# Patient Record
Sex: FEMALE | Race: BLACK OR AFRICAN AMERICAN | Employment: UNEMPLOYED | URBAN - METROPOLITAN AREA
[De-identification: names, ages, dates, MRNs, and addresses within clinical notes are randomized per-mention and may not be internally consistent; named-entity substitution may affect disease eponyms.]

---

## 2017-09-22 ENCOUNTER — HOSPITAL ENCOUNTER (OUTPATIENT)
Dept: PHYSICAL THERAPY | Age: 67
Discharge: HOME | End: 2017-09-22
Payer: MEDICAID

## 2017-09-22 PROCEDURE — 97110 THERAPEUTIC EXERCISES: CPT | Performed by: PHYSICAL THERAPIST

## 2017-09-22 PROCEDURE — 97162 PT EVAL MOD COMPLEX 30 MIN: CPT | Performed by: PHYSICAL THERAPIST
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
## 2017-09-29 ENCOUNTER — HOSPITAL ENCOUNTER (OUTPATIENT)
Dept: PHYSICAL THERAPY | Age: 67
Discharge: HOME | End: 2017-09-29
Payer: MEDICAID

## 2017-09-29 PROCEDURE — 97112 NEUROMUSCULAR REEDUCATION: CPT

## 2017-09-29 PROCEDURE — 97110 THERAPEUTIC EXERCISES: CPT
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
## 2017-10-02 ENCOUNTER — HOSPITAL ENCOUNTER (OUTPATIENT)
Dept: PHYSICAL THERAPY | Age: 67
Discharge: HOME | End: 2017-10-02
Payer: MEDICAID

## 2017-10-02 PROCEDURE — 97110 THERAPEUTIC EXERCISES: CPT

## 2017-10-02 PROCEDURE — 97112 NEUROMUSCULAR REEDUCATION: CPT
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
## 2017-10-06 ENCOUNTER — HOSPITAL ENCOUNTER (OUTPATIENT)
Dept: PHYSICAL THERAPY | Age: 67
Discharge: HOME | End: 2017-10-06
Payer: MEDICAID

## 2017-10-06 PROCEDURE — 97112 NEUROMUSCULAR REEDUCATION: CPT

## 2017-10-06 PROCEDURE — 97110 THERAPEUTIC EXERCISES: CPT
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
## 2017-10-09 ENCOUNTER — HOSPITAL ENCOUNTER (OUTPATIENT)
Dept: PHYSICAL THERAPY | Age: 67
Discharge: HOME | End: 2017-10-09
Payer: MEDICAID

## 2017-10-09 PROCEDURE — 97112 NEUROMUSCULAR REEDUCATION: CPT

## 2017-10-09 PROCEDURE — 97110 THERAPEUTIC EXERCISES: CPT
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
## 2017-10-12 ENCOUNTER — APPOINTMENT (OUTPATIENT)
Dept: PHYSICAL THERAPY | Age: 67
End: 2017-10-12
Payer: MEDICAID

## 2017-10-12 NOTE — PROGRESS NOTES
[facility]              [address]        [place], [address] [place]   [phone] fax    Discharge Summary  Patient name: Shahana Saenz Start of Care: 17   Referral source: Brock Salmon MD : 1950   Medical/Treatment Diagnosis: Dorsalgia, unspecified [M54.9]  Cervicalgia [M54.2]  Pain in unspecified shoulder [M25.519]  Pain in unspecified hip [M25.559] Onset Date:17     Prior Hospitalization: see medical history Provider#: [id number]   Medications: Verified on Patient Summary List    Comorbidities: allergies, arthritis, back pain, BMI > 30, HA, HBP   Prior Level of Function: Hx mild B knee pain, not working, lives w/ daughter, not ambulating w/ AD  Visits from Start of Care: 5    Missed Visits: 0  Reporting Period : 17 to 10/09/17    Summary of Care:  Pt is to go see the orthopedic specialist prior to continuing PT as instructed by her PCP. DC at this time. ASSESSMENT/RECOMMENDATIONS:  [x]Discontinue therapy: []Patient has reached or is progressing toward set goals      []Patient is non-compliant or has abdicated      []Due to lack of appreciable progress towards set goals  OTHER: Pt to go to orthopedic specialist prior to continuing w/ PT    Tima Alonso PT 10/12/2017 10:03 AM    NOTE TO PHYSICIAN:  Please complete the following and fax to: [facility] at [phone]  . Retain this original for your records. If you are unable to process this request in   24 hours, please contact our office.      [] I have read the above report and request that my patient continue therapy with the following changes/special instructions:  [] I have read the above report and request that my patient be discharged from therapy    Physician's Signature:_____________________ Date:___________Time:__________

## 2017-10-18 ENCOUNTER — APPOINTMENT (OUTPATIENT)
Dept: PHYSICAL THERAPY | Age: 67
End: 2017-10-18
Payer: MEDICAID

## 2017-10-20 ENCOUNTER — APPOINTMENT (OUTPATIENT)
Dept: PHYSICAL THERAPY | Age: 67
End: 2017-10-20
Payer: MEDICAID

## 2017-10-23 ENCOUNTER — APPOINTMENT (OUTPATIENT)
Dept: PHYSICAL THERAPY | Age: 67
End: 2017-10-23
Payer: MEDICAID

## 2017-10-26 ENCOUNTER — APPOINTMENT (OUTPATIENT)
Dept: PHYSICAL THERAPY | Age: 67
End: 2017-10-26
Payer: MEDICAID

## 2017-10-30 ENCOUNTER — APPOINTMENT (OUTPATIENT)
Dept: PHYSICAL THERAPY | Age: 67
End: 2017-10-30
Payer: MEDICAID

## 2017-11-01 ENCOUNTER — OFFICE VISIT (OUTPATIENT)
Dept: ORTHOPEDIC SURGERY | Age: 67
End: 2017-11-01

## 2017-11-01 VITALS
HEART RATE: 71 BPM | SYSTOLIC BLOOD PRESSURE: 138 MMHG | BODY MASS INDEX: 40.34 KG/M2 | WEIGHT: 251 LBS | HEIGHT: 66 IN | DIASTOLIC BLOOD PRESSURE: 86 MMHG

## 2017-11-01 DIAGNOSIS — M54.50 LOW BACK PAIN WITHOUT SCIATICA, UNSPECIFIED BACK PAIN LATERALITY, UNSPECIFIED CHRONICITY: Primary | ICD-10-CM

## 2017-11-01 DIAGNOSIS — M54.2 CERVICAL PAIN (NECK): ICD-10-CM

## 2017-11-01 DIAGNOSIS — M54.6 THORACIC BACK PAIN, UNSPECIFIED BACK PAIN LATERALITY, UNSPECIFIED CHRONICITY: ICD-10-CM

## 2017-11-01 DIAGNOSIS — M47.816 SPONDYLOSIS OF LUMBAR REGION WITHOUT MYELOPATHY OR RADICULOPATHY: ICD-10-CM

## 2017-11-01 DIAGNOSIS — M51.36 DDD (DEGENERATIVE DISC DISEASE), LUMBAR: ICD-10-CM

## 2017-11-01 RX ORDER — CLONIDINE HYDROCHLORIDE 0.3 MG/1
TABLET ORAL
Refills: 1 | COMMUNITY
Start: 2017-10-19
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
## 2017-11-01 NOTE — MR AVS SNAPSHOT
Visit Information Date & Time Provider Department Dept. Phone Encounter #  
 11/1/2017  3:05 PM Timo Gold  [facility] [address] [facility] - [place] [phone] Follow-up Instructions Return in about 4 weeks (around 11/29/2017). Upcoming Health Maintenance Date Due Hepatitis C Screening 1950 DTaP/Tdap/Td series (1 - Tdap) 6/11/1973 BREAST CANCER SCRN MAMMOGRAM 6/11/2002 FOBT Q 1 YEAR AGE 50-75 6/11/2002 ZOSTER VACCINE AGE 60> 4/11/2012 GLAUCOMA SCREENING Q2Y 6/11/2017 OSTEOPOROSIS SCREENING (DEXA) 6/11/2017 Pneumococcal 65+ Low/Medium Risk (1 of 2 - PCV13) 6/11/2017 MEDICARE YEARLY EXAM 6/11/2017 INFLUENZA AGE 9 TO ADULT 8/1/2017 Allergies as of 11/1/2017  Review Complete On: 11/1/2017 By: Timo Gold MD  
 No Known Allergies Current Immunizations  Never Reviewed No immunizations on file. Not reviewed this visit You Were Diagnosed With   
  
 Codes Comments Low back pain without sciatica, unspecified back pain laterality, unspecified chronicity    -  Primary ICD-10-CM: M54.5 ICD-9-CM: 724.2 Spondylosis of lumbar region without myelopathy or radiculopathy     ICD-10-CM: M47.816 ICD-9-CM: 721.3 DDD (degenerative disc disease), lumbar     ICD-10-CM: M51.36 
ICD-9-CM: 722.52 Thoracic back pain, unspecified back pain laterality, unspecified chronicity     ICD-10-CM: M54.6 ICD-9-CM: 724.1 Cervical pain (neck)     ICD-10-CM: M54.2 ICD-9-CM: 723.1 Vitals BP Pulse Height(growth percentile) Weight(growth percentile) BMI Smoking Status 138/86 71 5' 6\" (1.676 m) 251 lb (113.9 kg) 40.51 kg/m2 Never Smoker Vitals History BMI and BSA Data Body Mass Index Body Surface Area 40.51 kg/m 2 2.3 m 2 Your Updated Medication List  
  
   
This list is accurate as of: 11/1/17  3:52 PM.  Always use your most recent med list.  
  
  
  
  
 cloNIDine HCl 0.3 mg tablet Commonly known as:  CATAPRES TK 1 T PO QHS We Performed the Following AMB POC XRAY, SPINE, LUMBOSACRAL; 2 O [37980 CPT(R)] REFERRAL TO PHYSICAL THERAPY [RRK24 Custom] Comments:  
 DX:Cervical/thoracic/lumbar HEP 
[facility] 
2-3 visits/ 2-3 weeks Follow-up Instructions Return in about 4 weeks (around 11/29/2017). Referral Information Referral ID Referred By Referred To  
  
 [id number] RAHEEL LUGO III Not Available Visits Status Start Date End Date 1 New Request 11/1/17 11/1/18 If your referral has a status of pending review or denied, additional information will be sent to support the outcome of this decision. Introducing [facility]! [facility] introduces [employer] patient portal. Now you can access parts of your medical record, email your doctor's office, and request medication refills online. 1. In your internet browser, go to [email or web address] 2. Click on the First Time User? Click Here link in the Sign In box. You will see the New Member Sign Up page. 3. Enter your [employer] Access Code exactly as it appears below. You will not need to use this code after youve completed the sign-up process. If you do not sign up before the expiration date, you must request a new code. · [employer] Access Code: [email or web address] Expires: 12/20/2017 11:26 AM 
 
4. Enter the last four digits of your Social Security Number (xxxx) and Date of Birth (mm/dd/yyyy) as indicated and click Submit. You will be taken to the next sign-up page. 5. Create a [employer] ID. This will be your [employer] login ID and cannot be changed, so think of one that is secure and easy to remember. 6. Create a [employer] password. You can change your password at any time. 7. Enter your Password Reset Question and Answer. This can be used at a later time if you forget your password. 8. Enter your e-mail address. You will receive e-mail notification when new information is available in [address] 9. Click Sign Up. You can now view and download portions of your medical record. 10. Click the Download Summary menu link to download a portable copy of your medical information. If you have questions, please visit the Frequently Asked Questions section of the "[employer] [facility] [employer] website. Remember, "[employer] [facility] is NOT to be used for urgent needs. For medical emergencies, dial 911. Now available from your iPhone and Android! Please provide this summary of care documentation to your next provider. Your primary care clinician is listed as Blas Ly. If you have any questions after today's visit, please call [phone].

## 2017-11-02 ENCOUNTER — APPOINTMENT (OUTPATIENT)
Dept: PHYSICAL THERAPY | Age: 67
End: 2017-11-02

## 2017-11-08 ENCOUNTER — HOSPITAL ENCOUNTER (OUTPATIENT)
Dept: PHYSICAL THERAPY | Age: 67
Discharge: HOME | End: 2017-11-08
Payer: MEDICARE

## 2017-11-08 PROCEDURE — 97110 THERAPEUTIC EXERCISES: CPT | Performed by: PHYSICAL THERAPIST

## 2017-11-08 PROCEDURE — G8979 MOBILITY GOAL STATUS: HCPCS | Performed by: PHYSICAL THERAPIST

## 2017-11-08 PROCEDURE — 97162 PT EVAL MOD COMPLEX 30 MIN: CPT | Performed by: PHYSICAL THERAPIST

## 2017-11-08 PROCEDURE — 97140 MANUAL THERAPY 1/> REGIONS: CPT | Performed by: PHYSICAL THERAPIST

## 2017-11-08 PROCEDURE — G8978 MOBILITY CURRENT STATUS: HCPCS | Performed by: PHYSICAL THERAPIST
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
## 2017-11-09 ENCOUNTER — HOSPITAL ENCOUNTER (OUTPATIENT)
Dept: PHYSICAL THERAPY | Age: 67
Discharge: HOME | End: 2017-11-09
Payer: MEDICARE

## 2017-11-09 PROCEDURE — 97140 MANUAL THERAPY 1/> REGIONS: CPT

## 2017-11-09 PROCEDURE — 97110 THERAPEUTIC EXERCISES: CPT

## 2017-11-09 PROCEDURE — 97112 NEUROMUSCULAR REEDUCATION: CPT

## 2017-11-09 NOTE — PROGRESS NOTES
PT DAILY TREATMENT NOTE - [facility]     Patient Name: Rosa Elena Klein  Date:2017  : 1950  [x]  Patient  Verified  Payor: [place] MEDICARE / Plan: [place] MEDICARE PART A & B / Product Type: Medicare /    In time:4:52  Out time:5:49  Total Treatment Time (min): 57  Total Timed Codes (min): 47  1:1 Treatment Time ( [address] only): 52   Visit #: 2 of 12    Treatment Area: Low back pain [M54.5]  Cervicalgia [M54.2]  Pain in thoracic spine [M54.6]    SUBJECTIVE  Pain Level (0-10 scale): 4  Any medication changes, allergies to medications, adverse drug reactions, diagnosis change, or new procedure performed?: [x] No    [] Yes (see summary sheet for update)  Subjective functional status/changes:   [] No changes reported  Pt reports that the left side of her back is bothering her more today. OBJECTIVE    Modality rationale: decrease pain to improve the patients ability to decrease difficulty while performing tasks.     Min Type Additional Details    [] Estim:  []Unatt       []IFC  []Premod                        []Other:  []w/ice   []w/heat  Position:  Location:    [] Estim: []Att    []TENS instruct  []NMES                    []Other:  []w/US   []w/ice   []w/heat  Position:  Location:    []  Traction: [] Cervical       []Lumbar                       [] Prone          []Supine                       []Intermittent   []Continuous Lbs:  [] before manual  [] after manual    []  Ultrasound: []Continuous   [] Pulsed                           []1MHz   []3MHz W/cm2:  Location:    []  Iontophoresis with dexamethasone         Location: [] Take home patch   [] In clinic   10 []  Ice     [x]  heat  []  Ice massage  []  Laser   []  Anodyne Position:sitting  Location:neck and back     []  Laser with stim  []  Other:  Position:  Location:    []  Vasopneumatic Device Pressure:       [] lo [] med [] hi   Temperature: [] lo [] med [] hi   [] Skin assessment post-treatment:  []intact []redness- no adverse reaction    []redness  adverse reaction:       27 min Therapeutic Exercise:  [x] See flow sheet :   Rationale: increase ROM and increase strength to improve the patients ability to increase tolerance to activities. 10 min Neuromuscular Re-education:  [x]  See flow sheet :core activation    Rationale: increase ROM and increase strength  to improve the patients ability to increase standing tolerance. 10 min Manual Therapy:  SOR, TPR/DTM to B UT, LS, cervical paraspinals, lumbar  paraspinals, B piriformis mm and gluts, l/s, nd c/s PA's   Rationale: decrease pain, increase ROM, increase tissue extensibility and decrease trigger points to increase ease with ADLs. With   [] TE   [] TA   [] neuro   [] other: Patient Education: [x] Review HEP    [] Progressed/Changed HEP based on:   [] positioning   [] body mechanics   [] transfers   [] heat/ice application    [] other:      Other Objective/Functional Measures:Pt reports performing HEP. Pain Level (0-10 scale) post treatment: 0    ASSESSMENT/Changes in Function: Initiated exercises per POC. Patient will continue to benefit from skilled PT services to modify and progress therapeutic interventions, address functional mobility deficits, address ROM deficits, address strength deficits and analyze and address soft tissue restrictions to attain remaining goals. []  See Plan of Care  []  See progress note/recertification  []  See Discharge Summary         Progress towards goals / Updated goals:  Short Term Goals: To be accomplished in 1 weeks:  1. Pt will be independent and compliant w/ HEP to progress gains in PT. At eval: initiated HEP  Current: Goal met: Pt reports performing HEP. 11/9/17   Long Term Goals: To be accomplished in 6 weeks:  1. Pt will improve neck FOTO to > or = to 55 to demo improved function. At eval: neck FOTO = 42    2. Pt will improve back FOTO to > or = to 45 to demo improved function. At eval: back FOTO = 28  3.  Pt will increase Lumbar AROM to pain free grossly for improved ability to shower. At eval: Lumbar AROM limited 25% into flexion w/ pain, ext to neutral w/ pain, B SB WNL w/ pain into R SB  4. Pt will increase MMT B hips to > or = to 4-4+/5 for improved stair navigation. At eval: MMT B LE grossly 3+-4-/5  5. Pt will increase SLS B to > or = to 10 seconds w/o HHA for decreased fall risk.    At eval: Pt unable to stand SLS B w/o HHA x1    PLAN  []  Upgrade activities as tolerated     [x]  Continue plan of care  []  Update interventions per flow sheet       []  Discharge due to:_  []  Other:_      Tita Angela PTA 11/9/2017  5:16 PM    Future Appointments  Date Time Provider Cristian Toth   11/14/2017 2:30 PM Yaneth Samuel, PT [facility]   11/17/2017 9:30 AM Tita Angela PTA [facility]   11/21/2017 4:30 PM Yaneth Samuel PT [facility]   11/29/2017 9:30 AM Tita Angela PTA [facility]   11/30/2017 2:00 PM Tita Angela PTA [facility]   12/4/2017 3:20 PM Heather Jones MD [facility]

## 2017-11-14 ENCOUNTER — HOSPITAL ENCOUNTER (OUTPATIENT)
Dept: PHYSICAL THERAPY | Age: 67
Discharge: HOME | End: 2017-11-14
Payer: MEDICARE

## 2017-11-14 PROCEDURE — 97140 MANUAL THERAPY 1/> REGIONS: CPT | Performed by: PHYSICAL THERAPIST

## 2017-11-14 PROCEDURE — 97112 NEUROMUSCULAR REEDUCATION: CPT | Performed by: PHYSICAL THERAPIST

## 2017-11-14 PROCEDURE — 97110 THERAPEUTIC EXERCISES: CPT | Performed by: PHYSICAL THERAPIST

## 2017-11-14 NOTE — PROGRESS NOTES
PT DAILY TREATMENT NOTE - [facility]     Patient Name: Candy Ortiz  Date:2017  : 1950  [x]  Patient  Verified  Payor: [place] MEDICARE / Plan: [place] MEDICARE PART A & B / Product Type: Medicare /    In time:2:27  Out time:3:26  Total Treatment Time (min): 59  Total Timed Codes (min): 49  1:1 Treatment Time ( [address] only): 52   Visit #: 3 of 12    Treatment Area: Low back pain [M54.5]  Cervicalgia [M54.2]  Pain in thoracic spine [M54.6]    SUBJECTIVE  Pain Level (0-10 scale): 5  Any medication changes, allergies to medications, adverse drug reactions, diagnosis change, or new procedure performed?: [x] No    [] Yes (see summary sheet for update)  Subjective functional status/changes:   [] No changes reported  The back and the leg hurt today. Does not want to use the bike.      OBJECTIVE    Modality rationale: decrease pain to improve the patients ability to complete ADLs   Min Type Additional Details   10 []  Ice     [x]  heat  []  Ice massage  []  Laser   []  Anodyne Position: long sitting  Location: prone   [] Skin assessment post-treatment:  []intact []redness- no adverse reaction    []redness  adverse reaction:     31 min Therapeutic Exercise:  [x] See flow sheet :   Rationale: increase ROM, increase strength and decrease pain to improve the patients ability to complete ADLs    10 min Neuromuscular Re-education:  []  See flow sheet :   Rationale: improve coordination and increase proprioception  to improve the patients ability to complete ADLs    8 min Manual Therapy:  TPR/DTM to piriformis, lumbar paraspinals, PA to lumbar region, SOR, TPR to UT/LS   Rationale: decrease pain to complete ADLs          With   [] TE   [] TA   [] neuro   [] other: Patient Education: [x] Review HEP    [] Progressed/Changed HEP based on:   [] positioning   [] body mechanics   [] transfers   [] heat/ice application    [] other:         Pain Level (0-10 scale) post treatment: 5    ASSESSMENT/Changes in Function: Patient responds well to treatment session. Patient does not tolerate manual therapy forces to an extent able to mobilize vertebral joints. She does report benefit from the intervention nonetheless. No adverse effects were noted from today's treatment session      Patient will continue to benefit from skilled PT services to modify and progress therapeutic interventions, address functional mobility deficits, address ROM deficits, address strength deficits, analyze and address soft tissue restrictions, analyze and cue movement patterns, analyze and modify body mechanics/ergonomics and assess and modify postural abnormalities to attain remaining goals. []  See Plan of Care  []  See progress note/recertification  []  See Discharge Summary         Progress towards goals / Updated goals:  Short Term Goals: To be accomplished in 1 weeks:  1. Pt will be independent and compliant w/ HEP to progress gains in PT. At eval: initiated HEP  Current: Goal met: Pt reports performing HEP. 11/9/17   Long Term Goals: To be accomplished in 6 weeks:  1. Pt will improve neck FOTO to > or = to 55 to demo improved function. At eval: neck FOTO = 42    2. Pt will improve back FOTO to > or = to 45 to demo improved function. At eval: back FOTO = 28  3. Pt will increase Lumbar AROM to pain free grossly for improved ability to shower. At eval: Lumbar AROM limited 25% into flexion w/ pain, ext to neutral w/ pain, B SB WNL w/ pain into R SB  4. Pt will increase MMT B hips to > or = to 4-4+/5 for improved stair navigation. At eval: MMT B LE grossly 3+-4-/5  5. Pt will increase SLS B to > or = to 10 seconds w/o HHA for decreased fall risk.    At eval: Pt unable to stand SLS B w/o HHA x1    PLAN  []  Upgrade activities as tolerated     [x]  Continue plan of care  []  Update interventions per flow sheet       []  Discharge due to:_  []  Other:_      King Sanna, PT, DPT 11/14/2017  2:30 PM    Future Appointments  Date Time Provider Cristian Toth 11/17/2017 9:30 AM Louie Mendez, PTA [facility]   11/21/2017 4:30 PM Esmer Merino, PT [facility]   11/28/2017 1:00 PM Dimple Fester [facility]   12/1/2017 7:30 AM Dimple Fester [facility]   12/4/2017 3:20 PM Bernadette Crowder MD [facility]

## 2017-11-17 ENCOUNTER — HOSPITAL ENCOUNTER (OUTPATIENT)
Dept: PHYSICAL THERAPY | Age: 67
Discharge: HOME | End: 2017-11-17
Payer: MEDICARE

## 2017-11-17 PROCEDURE — 97140 MANUAL THERAPY 1/> REGIONS: CPT

## 2017-11-17 PROCEDURE — 97112 NEUROMUSCULAR REEDUCATION: CPT

## 2017-11-17 NOTE — PROGRESS NOTES
PT DAILY TREATMENT NOTE - [facility]     Patient Name: Rey Woody  Date:2017  : 1950  [x]  Patient  Verified  Payor: [place] MEDICARE / Plan: [place] MEDICARE PART A & B / Product Type: Medicare /    In time:9:25 Out time:10:22  Total Treatment Time (min): 57  Total Timed Codes (min): 47  1:1 Treatment Time ( only): 30   Visit #: 4 of 12    Treatment Area: Low back pain [M54.5]  Cervicalgia [M54.2]  Pain in thoracic spine [M54.6]    SUBJECTIVE  Pain Level (0-10 scale): 4  Any medication changes, allergies to medications, adverse drug reactions, diagnosis change, or new procedure performed?: [x] No    [] Yes (see summary sheet for update)  Subjective functional status/changes:   [] No changes reported  Pt reports not much change since last visit. OBJECTIVE    Modality rationale: decrease pain to improve the patients ability to decrease difficulty while performing tasks.     Min Type Additional Details    [] Estim:  []Unatt       []IFC  []Premod                        []Other:  []w/ice   []w/heat  Position:  Location:    [] Estim: []Att    []TENS instruct  []NMES                    []Other:  []w/US   []w/ice   []w/heat  Position:  Location:    []  Traction: [] Cervical       []Lumbar                       [] Prone          []Supine                       []Intermittent   []Continuous Lbs:  [] before manual  [] after manual    []  Ultrasound: []Continuous   [] Pulsed                           []1MHz   []3MHz W/cm2:  Location:    []  Iontophoresis with dexamethasone         Location: [] Take home patch   [] In clinic   10 []  Ice     [x]  heat  []  Ice massage  []  Laser   []  Anodyne Position:sitting  Location:neck and back     []  Laser with stim  []  Other:  Position:  Location:    []  Vasopneumatic Device Pressure:       [] lo [] med [] hi   Temperature: [] lo [] med [] hi   [] Skin assessment post-treatment:  []intact []redness- no adverse reaction    []redness  adverse reaction:       27 min Therapeutic Exercise:  [x] See flow sheet :   Rationale: increase ROM and increase strength to improve the patients ability to increase tolerance to activities.        10 min Neuromuscular Re-education:  [x]  See flow sheet :core activation    Rationale: increase ROM and increase strength  to improve the patients ability to increase standing tolerance.      10 min Manual Therapy:  SOR, TPR/DTM to B UT, LS, cervical paraspinals, lumbar  paraspinals, B piriformis mm and gluts, l/s, nd c/s PA's   Rationale: decrease pain, increase ROM, increase tissue extensibility and decrease trigger points to increase ease with ADLs.       With   [] TE   [] TA   [] neuro   [] other: Patient Education: [x] Review HEP    [] Progressed/Changed HEP based on:   [] positioning   [] body mechanics   [] transfers   [] heat/ice application    [] other:      Other Objective/Functional Measures: Lumbar AROM WNL in all planes, L SB with pain     Pain Level (0-10 scale) post treatment: 2    ASSESSMENT/Changes in Function: Continue to progress pt as tolerated. Patient will continue to benefit from skilled PT services to modify and progress therapeutic interventions, address functional mobility deficits, address ROM deficits, address strength deficits and analyze and address soft tissue restrictions to attain remaining goals. []  See Plan of Care  []  See progress note/recertification  []  See Discharge Summary         Progress towards goals / Updated goals:  Short Term Goals: To be accomplished in 1 weeks:  1. Pt will be independent and compliant w/ HEP to progress gains in PT. At eval: initiated HEP  Current: Goal met: Pt reports performing HEP. 11/9/17   Long Term Goals: To be accomplished in 6 weeks:  1. Pt will improve neck FOTO to > or = to 55 to demo improved function. At eval: neck FOTO = 42    2. Pt will improve back FOTO to > or = to 45 to demo improved function. At eval: back FOTO = 28  3.  Pt will increase Lumbar AROM to pain free grossly for improved ability to shower. At eval: Lumbar AROM limited 25% into flexion w/ pain, ext to neutral w/ pain, B SB WNL w/ pain into R SB  Current: Progressed: Lumbar AROM WNL in all planes, L SB with pain 11/17/17  4. Pt will increase MMT B hips to > or = to 4-4+/5 for improved stair navigation. At eval: MMT B LE grossly 3+-4-/5  5. Pt will increase SLS B to > or = to 10 seconds w/o HHA for decreased fall risk.    At eval: Pt unable to stand SLS B w/o HHA x1       PLAN  []  Upgrade activities as tolerated     [x]  Continue plan of care  []  Update interventions per flow sheet       []  Discharge due to:_  []  Other:_      Maikel Head, NITZA 11/17/2017  9:53 AM    Future Appointments  Date Time Provider Cristian Toth   11/21/2017 4:30 PM King Sanna PT [facility]   11/28/2017 1:00 PM Mitchell Loyd [facility]   12/1/2017 9:00 AM Mitchell Loyd [facility]   12/4/2017 3:20 PM Timo Gold MD [facility]

## 2017-11-21 ENCOUNTER — HOSPITAL ENCOUNTER (OUTPATIENT)
Dept: PHYSICAL THERAPY | Age: 67
Discharge: HOME | End: 2017-11-21
Payer: MEDICARE

## 2017-11-21 PROCEDURE — 97140 MANUAL THERAPY 1/> REGIONS: CPT | Performed by: PHYSICAL THERAPIST

## 2017-11-21 PROCEDURE — 97110 THERAPEUTIC EXERCISES: CPT | Performed by: PHYSICAL THERAPIST

## 2017-11-21 NOTE — PROGRESS NOTES
PT DAILY TREATMENT NOTE - [facility]     Patient Name: Rosa Elena Klein  Date:2017  : 1950  [x]  Patient  Verified  Payor: VA MEDICARE / Plan: VA MEDICARE PART A & B / Product Type: Medicare /    In time:4:02  Out time:4:46  Total Treatment Time (min): 44  Total Timed Codes (min): 34  1:1 Treatment Time ( only): 34   Visit #: 5 of 12    Treatment Area: Low back pain [M54.5]  Cervicalgia [M54.2]  Pain in thoracic spine [M54.6]    SUBJECTIVE  Pain Level (0-10 scale): 5  Any medication changes, allergies to medications, adverse drug reactions, diagnosis change, or new procedure performed?: [x] No    [] Yes (see summary sheet for update)  Subjective functional status/changes:   [] No changes reported  Feels good. No new issues. OBJECTIVE    Modality rationale: decrease pain to improve the patients ability to complete ADLs   Min Type Additional Details   10 []  Ice     [x]  heat  []  Ice massage  []  Laser   []  Anodyne Position: prone  Location: L/s   [] Skin assessment post-treatment:  []intact []redness- no adverse reaction    []redness  adverse reaction:     26 min Therapeutic Exercise:  [x] See flow sheet :   Rationale: increase ROM, increase strength and decrease pain to improve the patients ability to complete ADLs    8 min Manual Therapy:  TPR/DTM to piriformis, lumbar paraspinals, PA to lumbar region   Rationale: decrease pain to complete ADLs          With   [] TE   [] TA   [] neuro   [] other: Patient Education: [x] Review HEP    [] Progressed/Changed HEP based on:   [] positioning   [] body mechanics   [] transfers   [] heat/ice application    [] other:         Pain Level (0-10 scale) post treatment: 0    ASSESSMENT/Changes in Function: Patient responds well to treatment session. Is progressing well overall. Responds well to manual treatments.  No adverse effects were noted from today's treatment session      Patient will continue to benefit from skilled PT services to modify and progress therapeutic interventions, address functional mobility deficits, address ROM deficits, address strength deficits, analyze and address soft tissue restrictions, analyze and cue movement patterns, analyze and modify body mechanics/ergonomics and assess and modify postural abnormalities to attain remaining goals. []  See Plan of Care  []  See progress note/recertification  []  See Discharge Summary         Progress towards goals / Updated goals:  Short Term Goals: To be accomplished in 1 weeks:  1. Pt will be independent and compliant w/ HEP to progress gains in PT. At eval: initiated HEP  Current: Goal met: Pt reports performing HEP. 11/9/17   Long Term Goals: To be accomplished in 6 weeks:  1. Pt will improve neck FOTO to > or = to 55 to demo improved function. At eval: neck FOTO = 42    2. Pt will improve back FOTO to > or = to 45 to demo improved function. At eval: back FOTO = 28  3. Pt will increase Lumbar AROM to pain free grossly for improved ability to shower. At eval: Lumbar AROM limited 25% into flexion w/ pain, ext to neutral w/ pain, B SB WNL w/ pain into R SB  Current: Progressed: Lumbar AROM WNL in all planes, L SB with pain 11/17/17  4. Pt will increase MMT B hips to > or = to 4-4+/5 for improved stair navigation. At eval: MMT B LE grossly 3+-4-/5  5. Pt will increase SLS B to > or = to 10 seconds w/o HHA for decreased fall risk.    At eval: Pt unable to stand SLS B w/o HHA x1    PLAN  []  Upgrade activities as tolerated     [x]  Continue plan of care  []  Update interventions per flow sheet       []  Discharge due to:_  []  Other:_      Vance Gonzales, PT, DPT 11/21/2017  4:25 PM    Future Appointments  Date Time Provider Cristian Toth   11/28/2017 1:00 PM Armando Dejesus [facility]   12/1/2017 9:00 AM [facility]   12/4/2017 3:20 PM Bry Tovar MD [facility]

## 2017-11-28 ENCOUNTER — HOSPITAL ENCOUNTER (OUTPATIENT)
Dept: PHYSICAL THERAPY | Age: 67
Discharge: HOME | End: 2017-11-28
Payer: MEDICARE

## 2017-11-28 PROCEDURE — 97110 THERAPEUTIC EXERCISES: CPT

## 2017-11-28 PROCEDURE — 97112 NEUROMUSCULAR REEDUCATION: CPT

## 2017-11-28 NOTE — PROGRESS NOTES
PT DAILY TREATMENT NOTE - [facility]     Patient Name: Rajat Burns  Date:2017  : 1950  [x]  Patient  Verified  Payor: VA MEDICARE / Plan: VA MEDICARE PART A & B / Product Type: Medicare /    In time:12:50  Out time:1:34  Total Treatment Time (min): 44  Total Timed Codes (min): 34  1:1 Treatment Time ( only): 34   Visit #: 6 of 12    Treatment Area: Low back pain [M54.5]  Cervicalgia [M54.2]  Pain in thoracic spine [M54.6]    SUBJECTIVE  Pain Level (0-10 scale): 0  Any medication changes, allergies to medications, adverse drug reactions, diagnosis change, or new procedure performed?: [x] No    [] Yes (see summary sheet for update)  Subjective functional status/changes:   [] No changes reported  Pt reports she has not have neck or back pain since Friday. OBJECTIVE    Modality rationale: decrease pain to improve the patients ability to decrease difficulty while performing task.     Min Type Additional Details    [] Estim:  []Unatt       []IFC  []Premod                        []Other:  []w/ice   []w/heat  Position:  Location:    [] Estim: []Att    []TENS instruct  []NMES                    []Other:  []w/US   []w/ice   []w/heat  Position:  Location:    []  Traction: [] Cervical       []Lumbar                       [] Prone          []Supine                       []Intermittent   []Continuous Lbs:  [] before manual  [] after manual    []  Ultrasound: []Continuous   [] Pulsed                           []1MHz   []3MHz W/cm2:  Location:    []  Iontophoresis with dexamethasone         Location: [] Take home patch   [] In clinic   10 []  Ice     [x]  heat  []  Ice massage  []  Laser   []  Anodyne Position:sitting  Location:neck and back     []  Laser with stim  []  Other:  Position:  Location:    []  Vasopneumatic Device Pressure:       [] lo [] med [] hi   Temperature: [] lo [] med [] hi   [] Skin assessment post-treatment:  []intact []redness- no adverse reaction    []redness  adverse reaction:       24 min Therapeutic Exercise:  [x] See flow sheet :   Rationale: increase ROM and increase strength to improve the patients ability to increase tolerance to activities.         10 min Neuromuscular Re-education:  [x]  See flow sheet :core activation    Rationale: increase ROM and increase strength  to improve the patients ability to increase standing tolerance. With   [] TE   [] TA   [] neuro   [] other: Patient Education: [x] Review HEP    [] Progressed/Changed HEP based on:   [] positioning   [] body mechanics   [] transfers   [] heat/ice application    [] other:      Other Objective/Functional Measures:  L SLS 8 sec, R SLS 3 sec. Pain Level (0-10 scale) post treatment: 0    ASSESSMENT/Changes in Function: Hold on manual due to pain going 4 days without neck or back pain. Patient will continue to benefit from skilled PT services to modify and progress therapeutic interventions, address functional mobility deficits, address ROM deficits, address strength deficits and analyze and address soft tissue restrictions to attain remaining goals. []  See Plan of Care  []  See progress note/recertification  []  See Discharge Summary         Progress towards goals / Updated goals:  Short Term Goals: To be accomplished in 1 weeks:  1. Pt will be independent and compliant w/ HEP to progress gains in PT. At eval: initiated HEP  Current: Goal met: Pt reports performing HEP. 11/9/17   Long Term Goals: To be accomplished in 6 weeks:  1. Pt will improve neck FOTO to > or = to 55 to demo improved function. At eval: neck FOTO = 42    2. Pt will improve back FOTO to > or = to 45 to demo improved function. At eval: back FOTO = 28  3. Pt will increase Lumbar AROM to pain free grossly for improved ability to shower.    At eval: Lumbar AROM limited 25% into flexion w/ pain, ext to neutral w/ pain, B SB WNL w/ pain into R SB  Current: Progressed: Lumbar AROM WNL in all planes, L SB with pain 11/17/17  4. Pt will increase MMT B hips to > or = to 4-4+/5 for improved stair navigation. At eval: MMT B LE grossly 3+-4-/5  5. Pt will increase SLS B to > or = to 10 seconds w/o HHA for decreased fall risk.    At eval: Pt unable to stand SLS B w/o HHA x1  Current: Progress; L SLS 8 sec, R SLS 3 sec. 11/28/17       PLAN  []  Upgrade activities as tolerated     [x]  Continue plan of care  []  Update interventions per flow sheet       []  Discharge due to:_  []  Other:_      Norville Cowden, NITZA 11/28/2017  12:58 PM    Future Appointments  Date Time Provider Cristian Toth   12/1/2017 9:00 AM Low [facility]   12/4/2017 3:20 PM Thuy Calhoun MD [facility]

## 2017-11-29 ENCOUNTER — APPOINTMENT (OUTPATIENT)
Dept: PHYSICAL THERAPY | Age: 67
End: 2017-11-29
Payer: MEDICARE

## 2017-11-30 ENCOUNTER — APPOINTMENT (OUTPATIENT)
Dept: PHYSICAL THERAPY | Age: 67
End: 2017-11-30
Payer: MEDICARE

## 2017-12-01 ENCOUNTER — HOSPITAL ENCOUNTER (OUTPATIENT)
Dept: PHYSICAL THERAPY | Age: 67
Discharge: HOME | End: 2017-12-01
Payer: MEDICARE

## 2017-12-01 PROCEDURE — 97110 THERAPEUTIC EXERCISES: CPT

## 2017-12-01 PROCEDURE — 97112 NEUROMUSCULAR REEDUCATION: CPT

## 2017-12-01 NOTE — PROGRESS NOTES
PT DAILY TREATMENT NOTE - [facility] 3-16    Patient Name: Guru Saravia  Date:2017  : 1950  [x]  Patient  Verified  Payor: VA MEDICARE / Plan: VA MEDICARE PART A & B / Product Type: Medicare /    In time:8:54  Out time:9:43  Total Treatment Time (min): 49  Total Timed Codes (min): 39  1:1 Treatment Time ( [address] only): 44   Visit #: 7 of 12    Treatment Area: Low back pain [M54.5]  Cervicalgia [M54.2]  Pain in thoracic spine [M54.6]    SUBJECTIVE  Pain Level (0-10 scale): 3  Any medication changes, allergies to medications, adverse drug reactions, diagnosis change, or new procedure performed?: [x] No    [] Yes (see summary sheet for update)  Subjective functional status/changes:   [] No changes reported  Patient reports no new complaints.      OBJECTIVE  Modality rationale: decrease pain and increase tissue extensibility to improve the patients ability to ease ADL tolerance   Min Type Additional Details    [] Estim:  []Unatt       []IFC  []Premod                        []Other:  []w/ice   []w/heat  Position:  Location:    [] Estim: []Att    []TENS instruct  []NMES                    []Other:  []w/US   []w/ice   []w/heat  Position:  Location:    []  Traction: [] Cervical       []Lumbar                       [] Prone          []Supine                       []Intermittent   []Continuous Lbs:  [] before manual  [] after manual    []  Ultrasound: []Continuous   [] Pulsed                           []1MHz   []3MHz Location:  W/cm2:    []  Iontophoresis with dexamethasone         Location: [] Take home patch   [] In clinic   10 []  Ice     [x]  heat  []  Ice massage  []  Laser   []  Anodyne Position: seated  Location: c/s and l/s    []  Laser with stim  []  Other: Position:  Location:    []  Vasopneumatic Device Pressure:       [] lo [] med [] hi   Temperature: [] lo [] med [] hi   [] Skin assessment post-treatment:  []intact []redness- no adverse reaction    []redness  adverse reaction:     29 min Therapeutic Exercise:  [x] See flow sheet :   Rationale: increase ROM, increase strength and improve coordination to improve the patients ability to   Increase ease with ADLs    10 min Neuromuscular Re-education:  [x]  See flow sheet : core stabilization activities, pelvic awareness activities   Rationale: increase strength, improve coordination and increase proprioception  to improve the patients ability to improve core activation with daily activities             With   [] TE   [] TA   [] neuro   [] other: Patient Education: [x] Review HEP    [] Progressed/Changed HEP based on:   [] positioning   [] body mechanics   [] transfers   [] heat/ice application    [] other:      Other Objective/Functional Measures:   Difficulty with SLS     Pain Level (0-10 scale) post treatment: 0    ASSESSMENT/Changes in Function:   Patient making steady progress towards initial goals. Patient with poor carryover of cues, frequently rushing through reps. Will continue to focus on increasing B LE/hip sterength for ease with prolonged ambulation. Patient will continue to benefit from skilled PT services to analyze and cue movement patterns, analyze and modify body mechanics/ergonomics and assess and modify postural abnormalities to attain remaining goals. []  See Plan of Care  []  See progress note/recertification  []  See Discharge Summary         Progress towards goals / Updated goals:  Short Term Goals: To be accomplished in 1 weeks:  1. Pt will be independent and compliant w/ HEP to progress gains in PT. At eval: initiated HEP  Current: Goal met: Pt reports performing HEP. 11/9/17   Long Term Goals: To be accomplished in 6 weeks:  1. Pt will improve neck FOTO to > or = to 55 to demo improved function. At eval: neck FOTO = 42   Current:   2. Pt will improve back FOTO to > or = to 45 to demo improved function. At eval: back FOTO = 28  Current:   3. Pt will increase Lumbar AROM to pain free grossly for improved ability to shower. At eval: Lumbar AROM limited 25% into flexion w/ pain, ext to neutral w/ pain, B SB WNL w/ pain into R SB  Current: Progressed: Lumbar AROM WNL in all planes, L SB with pain 11/17/17  4. Pt will increase MMT B hips to > or = to 4-4+/5 for improved stair navigation. At eval: MMT B LE grossly 3+-4-/5  Current:   5. Pt will increase SLS B to > or = to 10 seconds w/o HHA for decreased fall risk.    At eval: Pt unable to stand SLS B w/o HHA x1  Current: Progress; L SLS 8 sec, R SLS 3 sec. 11/28/17    PLAN  [x]  Upgrade activities as tolerated     [x]  Continue plan of care  []  Update interventions per flow sheet       []  Discharge due to:_  []  Other:_      Armando Dejesus 12/1/2017  8:51 AM    Future Appointments  Date Time Provider Cristian Fuentesi   12/1/2017 9:00 AM Armando Dejesus [facility]   12/4/2017 3:20 PM Bry Tovar MD [facility]

## 2017-12-04 ENCOUNTER — OFFICE VISIT (OUTPATIENT)
Dept: ORTHOPEDIC SURGERY | Age: 67
End: 2017-12-04

## 2017-12-04 VITALS
HEIGHT: 66 IN | RESPIRATION RATE: 16 BRPM | WEIGHT: 247 LBS | BODY MASS INDEX: 39.7 KG/M2 | HEART RATE: 83 BPM | SYSTOLIC BLOOD PRESSURE: 147 MMHG | DIASTOLIC BLOOD PRESSURE: 87 MMHG

## 2017-12-04 DIAGNOSIS — M47.816 SPONDYLOSIS OF LUMBAR REGION WITHOUT MYELOPATHY OR RADICULOPATHY: Primary | ICD-10-CM

## 2017-12-04 DIAGNOSIS — M54.6 THORACIC BACK PAIN, UNSPECIFIED BACK PAIN LATERALITY, UNSPECIFIED CHRONICITY: ICD-10-CM

## 2017-12-04 DIAGNOSIS — M54.2 CERVICAL PAIN (NECK): ICD-10-CM

## 2017-12-04 DIAGNOSIS — M51.36 DDD (DEGENERATIVE DISC DISEASE), LUMBAR: ICD-10-CM

## 2017-12-04 RX ORDER — METHYLPREDNISOLONE 4 MG/1
TABLET ORAL
Qty: 1 DOSE PACK | Refills: 0 | Status: SHIPPED | OUTPATIENT
Start: 2017-12-04 | End: 2018-01-03 | Stop reason: ALTCHOICE

## 2017-12-04 RX ORDER — HYDROCODONE BITARTRATE AND ACETAMINOPHEN 5; 325 MG/1; MG/1
TABLET ORAL
COMMUNITY

## 2017-12-04 RX ORDER — OLMESARTAN MEDOXOMIL 20 MG/1
20 TABLET ORAL DAILY
COMMUNITY

## 2017-12-04 RX ORDER — IBUPROFEN 800 MG/1
800 TABLET ORAL
Qty: 45 TAB | Refills: 0 | Status: SHIPPED | OUTPATIENT
Start: 2017-12-04

## 2017-12-04 NOTE — MR AVS SNAPSHOT
Visit Information Date & Time Provider Department Dept. Phone Encounter #  
 12/4/2017  3:20 PM Maxwell Menjivar MD [facility] [place] [phone] [id number] Follow-up Instructions Return in about 4 weeks (around 1/1/2018). Upcoming Health Maintenance Date Due Hepatitis C Screening 1950 DTaP/Tdap/Td series (1 - Tdap) 6/11/1973 BREAST CANCER SCRN MAMMOGRAM 6/11/2002 FOBT Q 1 YEAR AGE 50-75 6/11/2002 ZOSTER VACCINE AGE 60> 4/11/2012 GLAUCOMA SCREENING Q2Y 6/11/2017 OSTEOPOROSIS SCREENING (DEXA) 6/11/2017 Pneumococcal 65+ Low/Medium Risk (1 of 2 - PCV13) 6/11/2017 MEDICARE YEARLY EXAM 6/11/2017 Influenza Age 5 to Adult 8/1/2017 Allergies as of 12/4/2017  Review Complete On: 12/4/2017 By: Maxwell Menjivar MD  
 No Known Allergies Current Immunizations  Never Reviewed No immunizations on file. Not reviewed this visit You Were Diagnosed With   
  
 Codes Comments Spondylosis of lumbar region without myelopathy or radiculopathy    -  Primary ICD-10-CM: M47.816 ICD-9-CM: 721.3 DDD (degenerative disc disease), lumbar     ICD-10-CM: M51.36 
ICD-9-CM: 722.52 Thoracic back pain, unspecified back pain laterality, unspecified chronicity     ICD-10-CM: M54.6 ICD-9-CM: 724.1 Cervical pain (neck)     ICD-10-CM: M54.2 ICD-9-CM: 723.1 Vitals BP Pulse Resp Height(growth percentile) Weight(growth percentile) BMI  
 147/87 83 16 5' 6\" (1.676 m) 247 lb (112 kg) 39.87 kg/m2 Smoking Status Never Smoker BMI and BSA Data Body Mass Index Body Surface Area  
 39.87 kg/m 2 2.28 m 2 Preferred Pharmacy Pharmacy Name Phone [facility] [address] [place] [address] AT [facility] [phone] Your Updated Medication List  
  
   
This list is accurate as of: 12/4/17  4:40 PM.  Always use your most recent med list.  
  
  
  
  
 BENICAR 20 mg tablet Generic drug:  olmesartan Take 20 mg by mouth daily. cloNIDine HCl 0.3 mg tablet Commonly known as:  CATAPRES TK 1 T PO QHS  
  
 ibuprofen 800 mg tablet Commonly known as:  MOTRIN Take 1 Tab by mouth three (3) times daily (with meals). methylPREDNISolone 4 mg tablet Commonly known as:  Lloyd Rist Per dose pack instructions NORCO 5-325 mg per tablet Generic drug:  HYDROcodone-acetaminophen Take  by mouth. Prescriptions Sent to Pharmacy Refills  
 methylPREDNISolone (MEDROL DOSEPACK) 4 mg tablet 0 Sig: Per dose pack instructions Class: Normal  
 Pharmacy: [facility] Ph #: [phone]  
 ibuprofen (MOTRIN) 800 mg tablet 0 Sig: Take 1 Tab by mouth three (3) times daily (with meals). Class: Normal  
 Pharmacy: [facility] [address] [place] [address] 78 Ph #: [phone] Route: Oral  
  
Follow-up Instructions Return in about 4 weeks (around 1/1/2018). To-Do List   
 12/06/2017 12:30 PM  
  Appointment with Jerrod Noonan PT at [facility]  [facility] CARDONA ([phone]) Introducing hospitals & HEALTH SERVICES! Delmer Londono introduces [employer] patient portal. Now you can access parts of your medical record, email your doctor's office, and request medication refills online. 1. In your internet browser, go to [email or web address] 2. Click on the First Time User? Click Here link in the Sign In box. You will see the New Member Sign Up page. 3. Enter your [employer] Access Code exactly as it appears below. You will not need to use this code after youve completed the sign-up process. If you do not sign up before the expiration date, you must request a new code. · [employer] Access Code: [email or web address] Expires: 12/20/2017 10:26 AM 
 
 4. Enter the last four digits of your Social Security Number (xxxx) and Date of Birth (mm/dd/yyyy) as indicated and click Submit. You will be taken to the next sign-up page. 5. Create a [employer] ID. This will be your [employer] login ID and cannot be changed, so think of one that is secure and easy to remember. 6. Create a [employer] password. You can change your password at any time. 7. Enter your Password Reset Question and Answer. This can be used at a later time if you forget your password. 8. Enter your e-mail address. You will receive e-mail notification when new information is available in [address] 9. Click Sign Up. You can now view and download portions of your medical record. 10. Click the Download Summary menu link to download a portable copy of your medical information. If you have questions, please visit the Frequently Asked Questions section of the [employer] website. Remember, [employer] is NOT to be used for urgent needs. For medical emergencies, dial 911. Now available from your iPhone and Android! Please provide this summary of care documentation to your next provider. Your primary care clinician is listed as Juan Diego Eric. If you have any questions after today's visit, please call [phone].
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
## 2017-12-06 ENCOUNTER — HOSPITAL ENCOUNTER (OUTPATIENT)
Dept: PHYSICAL THERAPY | Age: 67
Discharge: HOME | End: 2017-12-06
Payer: MEDICARE

## 2017-12-06 PROCEDURE — 97110 THERAPEUTIC EXERCISES: CPT

## 2017-12-06 PROCEDURE — G8979 MOBILITY GOAL STATUS: HCPCS

## 2017-12-06 PROCEDURE — 97112 NEUROMUSCULAR REEDUCATION: CPT

## 2017-12-06 PROCEDURE — G8978 MOBILITY CURRENT STATUS: HCPCS
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
## 2017-12-18 ENCOUNTER — HOSPITAL ENCOUNTER (OUTPATIENT)
Dept: PHYSICAL THERAPY | Age: 67
Discharge: HOME | End: 2017-12-18
Payer: MEDICARE

## 2017-12-18 PROCEDURE — G8980 MOBILITY D/C STATUS: HCPCS | Performed by: PHYSICAL THERAPIST

## 2017-12-18 PROCEDURE — 97112 NEUROMUSCULAR REEDUCATION: CPT

## 2017-12-18 PROCEDURE — G8979 MOBILITY GOAL STATUS: HCPCS | Performed by: PHYSICAL THERAPIST

## 2017-12-18 PROCEDURE — 97110 THERAPEUTIC EXERCISES: CPT

## 2017-12-18 NOTE — PROGRESS NOTES
PT DAILY TREATMENT NOTE - [facility]     Patient Name: Lesia Hayes  Date:2017  : 1950  [x]  Patient  Verified  [place]or: [place] MED[place] / Plan: [place] MEDICARE PART A & B / Product Type: Medicare /    In time:11:28  Out time:12:20  Total Treatment Time (min): 52  Total Timed Codes (min): 42  1:1 Treatment Time (MC only): 42   Visit #: 1 of 6 (signed PN)    Treatment Area: Low back pain [M54.5]  Cervicalgia [M54.2]  Pain in thoracic spine [M54.6]    SUBJECTIVE  Pain Level (0-10 scale): 6  Any medication changes, allergies to medications, adverse drug reactions, diagnosis change, or new procedure performed?: [x] No    [] Yes (see summary sheet for update)  Subjective functional status/changes:   [] No changes reported  Pt reports that she has been doing a lot of walking and exercises and that is why she is having more back pain than normal.     OBJECTIVE    Modality rationale: decrease pain to improve the patients ability to decrease difficulty while performing tasks.     Min Type Additional Details    [] Estim:  []Unatt       []IFC  []Premod                        []Other:  []w/ice   []w/heat  Position:  Location:    [] Estim: []Att    []TENS instruct  []NMES                    []Other:  []w/US   []w/ice   []w/heat  Position:  Location:    []  Traction: [] Cervical       []Lumbar                       [] Prone          []Supine                       []Intermittent   []Continuous Lbs:  [] before manual  [] after manual    []  Ultrasound: []Continuous   [] Pulsed                           []1MHz   []3MHz W/cm2:  Location:    []  Iontophoresis with dexamethasone         Location: [] Take home patch   [] In clinic   10 []  Ice     [x]  heat  []  Ice massage  []  Laser   []  Anodyne Position:supine  Location:back     []  Laser with stim  []  Other:  Position:  Location:    []  Vasopneumatic Device Pressure:       [] lo [] med [] hi   Temperature: [] lo [] med [] hi   [] Skin assessment post-treatment:  []intact []redness- no adverse reaction    []redness  adverse reaction:          32 min Therapeutic Exercise:  [x] See flow sheet :   Rationale: increase ROM and increase strength to improve the patients ability to increase tolerance to activities.         10 min Neuromuscular Re-education:  [x]  See flow sheet :core activation    Rationale: increase ROM and increase strength  to improve the patients ability to increase standing tolerance. With   [] TE   [] TA   [] neuro   [] other: Patient Education: [x] Review HEP    [] Progressed/Changed HEP based on:   [] positioning   [] body mechanics   [] transfers   [] heat/ice application    [] other:      Other Objective/Functional Measures:      Pain Level (0-10 scale) post treatment: 0    ASSESSMENT/Changes in Function: Continue to progress pt as tolerated. Patient will continue to benefit from skilled PT services to modify and progress therapeutic interventions, address functional mobility deficits, address ROM deficits, address strength deficits and analyze and address soft tissue restrictions to attain remaining goals. []  See Plan of Care  []  See progress note/recertification  []  See Discharge Summary         Progress towards goals / Updated goals:  Long Term Goals: To be accomplished in 6 weeks:  1. Pt will improve neck FOTO to > or = to 55 to demo improved function. At eval: neck FOTO = 42   Current: Progressing: FOTO = 47, increased by 5 since [facility]. 12/18/17  2. Pt will improve back FOTO to > or = to 45 to demo improved function. At eval: back FOTO = 28  Current: Progressing: FOTO= 36, increased by 8 since [facility]. 12/18/17  3. Pt will increase Lumbar AROM to pain free grossly for improved ability to shower. At PN:  Lumbar AROM WNL in all planes, (R) SB with pain 12/6/17  4. Pt will increase MMT B hips to > or = to 4-4+/5 for improved stair navigation. At PN:  HIp Strength: Flex: (B) 4-/5; Ext: (B) 3/5, Abd: (B) 4-/5 (12/6/17)   5.  Pt will increase SLS B to > or = to 10 seconds w/o HHA for decreased fall risk. At PN: SLS: (R) patient unable to maintain SLS without HHA (L) 2 sec then patient required HHA. (12/6/17)    Goals Met:   1. Pt will be independent and compliant w/ HEP to progress gains in PT.    At eval: initiated HEP  Current: Goal met: Pt reports performing HEP. 11/9/17   PLAN  []  Upgrade activities as tolerated     [x]  Continue plan of care  []  Update interventions per flow sheet       []  Discharge due to:_  []  Other:_      Dali Kemp, NITZA 12/18/2017  11:40 AM    Future Appointments  Date Time Provider Cristian Toth   1/3/2018 1:40 PM DYLAN Emanuel

## 2018-01-03 ENCOUNTER — OFFICE VISIT (OUTPATIENT)
Dept: ORTHOPEDIC SURGERY | Age: 68
End: 2018-01-03

## 2018-01-03 VITALS
WEIGHT: 257 LBS | SYSTOLIC BLOOD PRESSURE: 156 MMHG | HEART RATE: 74 BPM | HEIGHT: 66 IN | BODY MASS INDEX: 41.3 KG/M2 | DIASTOLIC BLOOD PRESSURE: 89 MMHG

## 2018-01-03 DIAGNOSIS — M51.36 DDD (DEGENERATIVE DISC DISEASE), LUMBAR: Primary | ICD-10-CM

## 2018-01-03 DIAGNOSIS — M47.816 SPONDYLOSIS OF LUMBAR REGION WITHOUT MYELOPATHY OR RADICULOPATHY: ICD-10-CM

## 2018-01-03 NOTE — PROGRESS NOTES
Chief complaint/History of Present Illness:  Chief Complaint   Patient presents with    Follow-up     lumbar pain     HPI  Shan Bailey is a  74 y.o.  female      HISTORY OF PRESENT ILLNESS:  The patient comes in today for follow up of her back pain. At the last visit with Dr. Lauryn Barba, she was complaining of whole spine pain. She now says it is more concentrated in her low back. He gave her a Medrol Dosepak. He also followed that up with Ibuprofen 800 mg. She is only taking it prn, but it does help when she needs it. She is in physical therapy, and she says that does help quite a bit too. She is very happy with her pain relief. She rates her pain anywhere from a 2-4/10, but it is much less than it was prior. She is retired. She is a nonsmoker. She denies fever and bowel or bladder dysfunction. PHYSICAL EXAM:  Ms. Leonel Leung is a 47-year-old female. She is alert and oriented. She has a full weightbearing, non-antalgic gait using a single-point cane. She ahs 4/5 strength of the bilateral lower extremities and negative straight leg raise. She does have pain with hyperextension of the lumbar spine. ASSESSENT/PLAN:  This is a patient with lumbar spondylosis who has benefitted from physical therapy, Medrol Dosepak, and Ibuprofen. She will continue the Ibuprofen PRN and finish out her therapy, and we will see her back as needed. Review of systems:    Past Medical History:   Diagnosis Date    Hypertension      History reviewed. No pertinent surgical history. Social History     Social History    Marital status: SINGLE     Spouse name: N/A    Number of children: N/A    Years of education: N/A     Occupational History    Not on file.      Social History Main Topics    Smoking status: Never Smoker    Smokeless tobacco: Never Used    Alcohol use No    Drug use: Not on file    Sexual activity: Not on file     Other Topics Concern    Not on file     Social History Narrative     History reviewed. No pertinent family history. Physical Exam:  Visit Vitals    /89    Pulse 74    Ht 5' 6\" (1.676 m)    Wt 257 lb (116.6 kg)    BMI 41.48 kg/m2     Pain Scale: /10     has been . reviewed and is appropriate          Diagnoses and all orders for this visit:    1. DDD (degenerative disc disease), lumbar    2. Spondylosis of lumbar region without myelopathy or radiculopathy          Follow-up Disposition:  Return if symptoms worsen or fail to improve.         We have informed Judith Cutler to notify us for immediate appointment if she has any worsening neurogical symptoms or if an emergency situation presents, then call [phone]

## 2018-01-03 NOTE — PATIENT INSTRUCTIONS

## 2018-01-09 NOTE — PROGRESS NOTES
[facility]              [address]        [place], [address] [place]   [phone] fax      Discharge Summary  Patient name: Jj Grant Start of Care: 17   Referral source: Verónica Nunez MD : 1950   Medical/Treatment Diagnosis: Low back pain [M54.5]  Cervicalgia [M54.2]  Pain in thoracic spine [M54.6] Onset Date:17     Prior Hospitalization: see medical history Provider#: [id number]   Medications: Verified on Patient Summary List    Comorbidities: arthritis, back pain, BMI > 30, HA, HBP, previous accidents  Prior Level of Function: Hx of back and neck pain that was manageable w/o intervention  Visits from Start of Care: 9    Missed Visits: 0  Reporting Period : 17 to 17      Summary of Care:  Progress towards goals / Updated goals:  Long Term Goals: To be accomplished in 6 weeks:  1. Pt will improve neck FOTO to > or = to 55 to demo improved function. At eval: neck FOTO = 42   Current: Progressing: FOTO = 47, increased by 5 since [facility]. 17  2. Pt will improve back FOTO to > or = to 45 to demo improved function. At eval: back FOTO = 28  Current: Progressing: FOTO= 36, increased by 8 since [facility]. 17  3. Pt will increase Lumbar AROM to pain free grossly for improved ability to shower. At PN: progressing, Lumbar AROM WNL in all planes, (R) SB with pain 17  4. Pt will increase MMT B hips to > or = to 4-4+/5 for improved stair navigation. At PN: remains: HIp Strength: Flex: (B) 4-/5; Ext: (B) 3/5, Abd: (B) 4-/5 (17)   5. Pt will increase SLS B to > or = to 10 seconds w/o HHA for decreased fall risk. At PN: progressing, SLS: (R) patient unable to maintain SLS without HHA (L) 2 sec then patient required HHA. (17)    Pt's last PN was on 17 and she only completed one visit since then and requested to be put on hold at that time w/no reason provided.  Pt was progressing with all goals and reporting improved overall pain since beginning PT. Pt has HEP and the last goal reassessments are above. DC at this time 2' pt not calling to get back on the schedule and having a records request sent. G-Codes (GP)  Mobility   [id number] Goal  CK= 40-59%  D/C  CL= 60-79%    The severity rating is based on clinical judgment and the FOTO Score score. ASSESSMENT/RECOMMENDATIONS:  [x]Discontinue therapy: []Patient has reached or is progressing toward set goals      [x]Patient is non-compliant or has abdicated      []Due to lack of appreciable progress towards set goals    Srinath Lundberg, PT 1/9/2018 1:50 PM    NOTE TO PHYSICIAN:  Please complete the following and fax to: [facility] at [phone]  . Retain this original for your records. If you are unable to process this request in   24 hours, please contact our office.      [] I have read the above report and request that my patient continue therapy with the following changes/special instructions:  [] I have read the above report and request that my patient be discharged from therapy    Physician's Signature:_____________________ Date:___________Time:__________

## 2019-06-12 ENCOUNTER — OFFICE VISIT (OUTPATIENT)
Dept: ORTHOPEDIC SURGERY | Age: 69
End: 2019-06-12

## 2019-06-12 VITALS
SYSTOLIC BLOOD PRESSURE: 145 MMHG | DIASTOLIC BLOOD PRESSURE: 96 MMHG | TEMPERATURE: 97.3 F | RESPIRATION RATE: 24 BRPM | HEART RATE: 75 BPM | HEIGHT: 66 IN | BODY MASS INDEX: 39.37 KG/M2 | WEIGHT: 245 LBS | OXYGEN SATURATION: 98 %

## 2019-06-12 DIAGNOSIS — M17.2 POST-TRAUMATIC OSTEOARTHRITIS OF KNEES, BILATERAL: Primary | ICD-10-CM

## 2019-06-12 DIAGNOSIS — M17.12 OSTEOARTHRITIS OF LEFT KNEE, UNSPECIFIED OSTEOARTHRITIS TYPE: ICD-10-CM

## 2019-06-12 DIAGNOSIS — M17.11 OSTEOARTHRITIS OF RIGHT KNEE, UNSPECIFIED OSTEOARTHRITIS TYPE: ICD-10-CM

## 2019-06-12 RX ORDER — AMLODIPINE BESYLATE AND ATORVASTATIN CALCIUM 5; 20 MG/1; MG/1
1 TABLET, FILM COATED ORAL
COMMUNITY

## 2019-06-12 RX ORDER — DICLOFENAC SODIUM 50 MG/1
50 TABLET, DELAYED RELEASE ORAL 2 TIMES DAILY WITH MEALS
Qty: 180 TAB | Refills: 2 | Status: SHIPPED | OUTPATIENT
Start: 2019-06-12

## 2019-06-12 RX ORDER — LOSARTAN POTASSIUM AND HYDROCHLOROTHIAZIDE 12.5; 5 MG/1; MG/1
TABLET ORAL
Refills: 1 | COMMUNITY
Start: 2019-05-03

## 2019-06-12 NOTE — PROGRESS NOTES
Patient: Elvin Velasco                MRN: [id number]       SSN: [id number]  YOB: 1950        AGE: 81 y.o. SEX: female  Body mass index is 39.54 kg/m². PCP: Buffy Hutchinson MD  06/12/19    Chief Complaint: Bilateral knee pain    HISTORY OF PRESENT ILLNESS:  Debby Lomeli is a 81year-old female who presents to the office today with bilateral knee pain. The left is worse than the right. She states it is a 10/10. She has pain at rest and also pain with ambulation. She says she takes Tylenol. She does not take any specific antiinflammatories. She has not had injections. She is in physical therapy for her back, as well as her knee. Past Medical History:   Diagnosis Date    Hypertension        History reviewed. No pertinent family history. Current Outpatient Medications   Medication Sig Dispense Refill    amLODIPine-atorvastatin (CADUET) 5-20 mg per tablet Take 1 Tab by mouth.  losartan-hydroCHLOROthiazide (HYZAAR) 50-12.5 mg per tablet TK 1 T PO QD  1    diclofenac EC (VOLTAREN) 50 mg EC tablet Take 1 Tab by mouth two (2) times daily (with meals). 180 Tab 2    cloNIDine HCl (CATAPRES) 0.3 mg tablet TK 1 T PO QHS  1    HYDROcodone-acetaminophen (NORCO) 5-325 mg per tablet Take  by mouth.  olmesartan (BENICAR) 20 mg tablet Take 20 mg by mouth daily.  ibuprofen (MOTRIN) 800 mg tablet Take 1 Tab by mouth three (3) times daily (with meals). 45 Tab 0       No Known Allergies    History reviewed. No pertinent surgical history.     Social History     Socioeconomic History    Marital status: SINGLE     Spouse name: Not on file    Number of children: Not on file    Years of education: Not on file    Highest education level: Not on file   Occupational History    Not on file   Social Needs    Financial resource strain: Not on file    Food insecurity:     Worry: Not on file     Inability: Not on file    Transportation needs:     Medical: Not on file Non-medical: Not on file   Tobacco Use    Smoking status: Never Smoker    Smokeless tobacco: Never Used   Substance and Sexual Activity    Alcohol use: No    Drug use: Not on file    Sexual activity: Not on file   Lifestyle    Physical activity:     Days per week: Not on file     Minutes per session: Not on file    Stress: Not on file   Relationships    Social connections:     Talks on phone: Not on file     Gets together: Not on file     Attends Rastafari service: Not on file     Active member of club or organization: Not on file     Attends meetings of clubs or organizations: Not on file     Relationship status: Not on file    Intimate partner violence:     Fear of current or ex partner: Not on file     Emotionally abused: Not on file     Physically abused: Not on file     Forced sexual activity: Not on file   Other Topics Concern    Not on file   Social History Narrative    Not on file       REVIEW OF SYSTEMS:      CON: negative for recent weight loss/gain, fever, or chills  EYE: negative for double or blurry vision  ENT: negative for hoarseness  RS:   negative for cough, URI, SOB  CV:  negative for chest pain, palpitations  GI:    negative for blood in stool, nausea/vomiting  :  negative for blood in urine  MS: As per HPI  Other systems reviewed and noted below. PHYSICAL EXAMINATION:  Visit Vitals  BP (!) 145/96   Pulse 75   Temp 97.3 °F (36.3 °C)   Resp 24   Ht 5' 6\" (1.676 m)   Wt 245 lb (111.1 kg)   SpO2 98%   BMI 39.54 kg/m²     Body mass index is 39.54 kg/m². GENERAL: Alert and oriented x3, in no acute distress, well-developed, well-nourished. HEENT: Normocephalic, atraumatic. RESP: Non labored breathing with equal chest rise on inspiration. CV: Well perfused extremities. No cyanosis or clubbing noted. ABDOMEN: Soft, non-tender, non-distended. PHYSICAL EXAM:  Physical exam of both knees with crepitus. No effusion, warmth or erythema.   Mild varus deformity on the left worse than the right. She lacks a few degrees of extension on the left and the right and flexes to 120 degrees with pain. Tender to palpation along the medial and lateral joint line. IMAGING:  X-rays were taken in the office of both knees today. These show arthritic changes, more severe on the left than the right. ASSESSMENT AND PLAN:   Edyta Katz is a 81year-old female with bilateral knee osteoarthritis. I have recommended an oral antiinflammatory. We discussed injections and she would like to hold off on that today. We will see how she responds to the antiinflammatory.              Electronically signed by: Ginette Pinto MD

## 2023-09-09 NOTE — PROGRESS NOTES
PT DAILY TREATMENT NOTE - [facility]     Patient Name: Krishna Drilling  Date:2017  : 1950  [x]  Patient  Verified  [place] / Plan: [place] MED[place] & B / Product Type: Medicare /    In time: 12:28  Out time: 1:25  Total Treatment Time (min): 57  Total Timed Codes (min): 47  1:1 Treatment Time ([facility] only): 38   Visit #: 8 of 12    Treatment Area: Low back pain [M54.5]  Cervicalgia [M54.2]  Pain in thoracic spine [M54.6]    SUBJECTIVE  Pain Level (0-10 scale): 5/10  Any medication changes, allergies to medications, adverse drug reactions, diagnosis change, or new procedure performed?: [x] No    [] Yes (see summary sheet for update)  Subjective functional status/changes:   [] No changes reported  Patient reports average pain is a 5/10 in midback/neck. Patient stated her average pain prior to start of PT was a 7/10. Patient describes current pain as aching/throbbing. Patient has difficulty with bending over, and altough cervical motion has improved patient stated she still has to be cautious and slow with movement to avoid exacerbation. Patient stated she doesn't feel as stiff as she once was. Patient reports pain is increased, but attributes it to the weather. OBJECTIVE    Modality rationale: decrease pain to improve the patients ability to perform ADls.     Min Type Additional Details    [] Estim:  []Unatt       []IFC  []Premod                        []Other:  []w/ice   []w/heat  Position:  Location:    [] Estim: []Att    []TENS instruct  []NMES                    []Other:  []w/US   []w/ice   []w/heat  Position:  Location:    []  Traction: [] Cervical       []Lumbar                       [] Prone          []Supine                       []Intermittent   []Continuous Lbs:  [] before manual  [] after manual    []  Ultrasound: []Continuous   [] Pulsed                           []1MHz   []3MHz W/cm2:  Location:    []  Iontophoresis with dexamethasone         Location: [] Take home patch   [] In clinic   10 []  Ice     [x]  heat  []  Ice massage  []  Laser   []  Anodyne Position: reclined  Location: back     []  Laser with stim  []  Other:  Position:  Location:    []  Vasopneumatic Device Pressure:       [] lo [] med [] hi   Temperature: [] lo [] med [] hi   [] Skin assessment post-treatment:  []intact []redness- no adverse reaction    []redness  adverse reaction:     39 min Therapeutic Exercise:  [x] See flow sheet :   Rationale: increase ROM and increase strength to improve the patients ability to perform ADLs. 8 min Neuromuscular Re-education:  [x]  See flow sheet : core activation    Rationale: increase strength and increase proprioception  to improve the patients ability to perform household management. With   [] TE   [] TA   [] neuro   [] other: Patient Education: [x] Review HEP    [] Progressed/Changed HEP based on:   [] positioning   [] body mechanics   [] transfers   [] heat/ice application    [] other:      Other Objective/Functional Measures:   SLS: (R) patient unable to maintain SLS without HHA (L) 2 sec then patient required HHA. Lumbar AROM: Flex: WNL, Ext: WNL, Rot: WNL without pain SB: WNL, but report of lumbar pain when Side bending to the (R). HIp Strength: Flex: (B) 4-/5; Ext: (B) 3/5, Abd: (B) 4-/5   Patient unable to fully clear buttock with supine bridges. Pain Level (0-10 scale) post treatment:  0/10     ASSESSMENT/Changes in Function: See PN. Patient reported no pain at end of session. Patient will continue to benefit from skilled PT services to modify and progress therapeutic interventions, address functional mobility deficits, address ROM deficits, address strength deficits, analyze and address soft tissue restrictions, analyze and cue movement patterns, assess and modify postural abnormalities and address imbalance/dizziness to attain remaining goals.      []  See Plan of Care  [x]  See progress note/recertification  []  See Discharge Summary Progress towards goals / Updated goals:  Short Term Goals: To be accomplished in 1 weeks:  1. Pt will be independent and compliant w/ HEP to progress gains in PT. At eval: initiated HEP  Current: Goal met: Pt reports performing HEP. 11/9/17   Long Term Goals: To be accomplished in 6 weeks:  1. Pt will improve neck FOTO to > or = to 55 to demo improved function. At eval: neck FOTO = 42   Current: Plan to assess next visit. 2. Pt will improve back FOTO to > or = to 45 to demo improved function. At eval: back FOTO = 28  Current: Plan to assess next visit   3. Pt will increase Lumbar AROM to pain free grossly for improved ability to shower. At eval: Lumbar AROM limited 25% into flexion w/ pain, ext to neutral w/ pain, B SB WNL w/ pain into R SB  Current: Progressed: Lumbar AROM WNL in all planes, (R) SB with pain 12/6/17  4. Pt will increase MMT B hips to > or = to 4-4+/5 for improved stair navigation. At eval: MMT B LE grossly 3+-4-/5  Current: HIp Strength: Flex: (B) 4-/5; Ext: (B) 3/5, Abd: (B) 4-/5 (12/6/17)   5. Pt will increase SLS B to > or = to 10 seconds w/o HHA for decreased fall risk. At eval: Pt unable to stand SLS B w/o HHA x1  Current: Progress; SLS: (R) patient unable to maintain SLS without HHA (L) 2 sec then patient required HHA.  (12/6/17)    PLAN  []  Upgrade activities as tolerated     [x]  Continue plan of care  []  Update interventions per flow sheet       []  Discharge due to:_  []  Other:_      Beltran Chiu, PT 12/6/2017  12:36 PM    Future Appointments  Date Time Provider Cristian Toth   1/3/2018 1:40 PM DYLAN Roa no